# Patient Record
Sex: FEMALE | Race: WHITE | NOT HISPANIC OR LATINO | URBAN - METROPOLITAN AREA
[De-identification: names, ages, dates, MRNs, and addresses within clinical notes are randomized per-mention and may not be internally consistent; named-entity substitution may affect disease eponyms.]

---

## 2019-11-25 ENCOUNTER — APPOINTMENT (RX ONLY)
Dept: URBAN - METROPOLITAN AREA CLINIC 23 | Facility: CLINIC | Age: 49
Setting detail: DERMATOLOGY
End: 2019-11-25

## 2019-11-25 DIAGNOSIS — L81.4 OTHER MELANIN HYPERPIGMENTATION: ICD-10-CM

## 2019-11-25 DIAGNOSIS — D22 MELANOCYTIC NEVI: ICD-10-CM

## 2019-11-25 DIAGNOSIS — D18.0 HEMANGIOMA: ICD-10-CM

## 2019-11-25 DIAGNOSIS — Q828 OTHER SPECIFIED ANOMALIES OF SKIN: ICD-10-CM

## 2019-11-25 DIAGNOSIS — Q826 OTHER SPECIFIED ANOMALIES OF SKIN: ICD-10-CM

## 2019-11-25 DIAGNOSIS — L29.8 OTHER PRURITUS: ICD-10-CM

## 2019-11-25 DIAGNOSIS — L85.3 XEROSIS CUTIS: ICD-10-CM

## 2019-11-25 DIAGNOSIS — Q819 OTHER SPECIFIED ANOMALIES OF SKIN: ICD-10-CM

## 2019-11-25 DIAGNOSIS — L29.89 OTHER PRURITUS: ICD-10-CM

## 2019-11-25 PROBLEM — D18.01 HEMANGIOMA OF SKIN AND SUBCUTANEOUS TISSUE: Status: ACTIVE | Noted: 2019-11-25

## 2019-11-25 PROBLEM — Q82.8 OTHER SPECIFIED CONGENITAL MALFORMATIONS OF SKIN: Status: ACTIVE | Noted: 2019-11-25

## 2019-11-25 PROBLEM — D23.72 OTHER BENIGN NEOPLASM OF SKIN OF LEFT LOWER LIMB, INCLUDING HIP: Status: ACTIVE | Noted: 2019-11-25

## 2019-11-25 PROBLEM — D22.5 MELANOCYTIC NEVI OF TRUNK: Status: ACTIVE | Noted: 2019-11-25

## 2019-11-25 PROCEDURE — ? TREATMENT REGIMEN

## 2019-11-25 PROCEDURE — ? COUNSELING

## 2019-11-25 PROCEDURE — 99203 OFFICE O/P NEW LOW 30 MIN: CPT

## 2019-11-25 ASSESSMENT — LOCATION SIMPLE DESCRIPTION DERM
LOCATION SIMPLE: RIGHT LOWER BACK
LOCATION SIMPLE: RIGHT POSTERIOR UPPER ARM
LOCATION SIMPLE: LEFT THIGH
LOCATION SIMPLE: RIGHT CHEEK
LOCATION SIMPLE: RIGHT BREAST
LOCATION SIMPLE: LEFT UPPER BACK
LOCATION SIMPLE: ABDOMEN
LOCATION SIMPLE: LEFT CLAVICULAR SKIN
LOCATION SIMPLE: LEFT POSTERIOR UPPER ARM

## 2019-11-25 ASSESSMENT — LOCATION DETAILED DESCRIPTION DERM
LOCATION DETAILED: RIGHT SUPERIOR MEDIAL LOWER BACK
LOCATION DETAILED: LEFT PROXIMAL POSTERIOR UPPER ARM
LOCATION DETAILED: RIGHT PROXIMAL POSTERIOR UPPER ARM
LOCATION DETAILED: LEFT SUPERIOR MEDIAL UPPER BACK
LOCATION DETAILED: RIGHT MEDIAL BREAST 5-6:00 REGION
LOCATION DETAILED: RIGHT INFERIOR LATERAL MALAR CHEEK
LOCATION DETAILED: RIGHT RIB CAGE
LOCATION DETAILED: LEFT CLAVICULAR SKIN
LOCATION DETAILED: LEFT ANTERIOR DISTAL THIGH

## 2019-11-25 ASSESSMENT — LOCATION ZONE DERM
LOCATION ZONE: TRUNK
LOCATION ZONE: ARM
LOCATION ZONE: LEG
LOCATION ZONE: FACE

## 2019-11-25 NOTE — PROCEDURE: TREATMENT REGIMEN
Samples Given: CeraVe hydrating cleanser, CeraVe AM, PM lotion
Otc Regimen: Urea cream
Detail Level: Zone
Otc Regimen: Zyrtec Daily for itch
Plan: Discussed atopic precautions, itching rash handout sheet, Refer to Dr. Alamo for patch testing and possibly food allergies.
Modify Regimen: Decrease showers, fragrance free hypoallergenic emollients daily.

## 2020-02-01 ENCOUNTER — HOSPITAL ENCOUNTER (OUTPATIENT)
Dept: MAMMOGRAPHY | Age: 50
Discharge: HOME | End: 2020-02-01
Attending: FAMILY MEDICINE
Payer: COMMERCIAL

## 2020-02-01 DIAGNOSIS — Z12.31 OTHER SCREENING MAMMOGRAM: ICD-10-CM

## 2020-02-01 PROCEDURE — 77063 BREAST TOMOSYNTHESIS BI: CPT

## 2020-02-17 ENCOUNTER — APPOINTMENT (RX ONLY)
Dept: URBAN - METROPOLITAN AREA CLINIC 23 | Facility: CLINIC | Age: 50
Setting detail: DERMATOLOGY
End: 2020-02-17

## 2020-02-17 DIAGNOSIS — Q828 OTHER SPECIFIED ANOMALIES OF SKIN: ICD-10-CM | Status: STABLE

## 2020-02-17 DIAGNOSIS — Q826 OTHER SPECIFIED ANOMALIES OF SKIN: ICD-10-CM | Status: STABLE

## 2020-02-17 DIAGNOSIS — L71.8 OTHER ROSACEA: ICD-10-CM

## 2020-02-17 DIAGNOSIS — Q819 OTHER SPECIFIED ANOMALIES OF SKIN: ICD-10-CM | Status: STABLE

## 2020-02-17 PROBLEM — Q82.8 OTHER SPECIFIED CONGENITAL MALFORMATIONS OF SKIN: Status: ACTIVE | Noted: 2020-02-17

## 2020-02-17 PROCEDURE — ? COUNSELING

## 2020-02-17 PROCEDURE — ? TREATMENT REGIMEN

## 2020-02-17 PROCEDURE — 99213 OFFICE O/P EST LOW 20 MIN: CPT

## 2020-02-17 ASSESSMENT — LOCATION DETAILED DESCRIPTION DERM
LOCATION DETAILED: RIGHT INFERIOR LATERAL MALAR CHEEK
LOCATION DETAILED: RIGHT PROXIMAL POSTERIOR UPPER ARM
LOCATION DETAILED: LEFT PROXIMAL POSTERIOR UPPER ARM
LOCATION DETAILED: LEFT LOWER CUTANEOUS LIP
LOCATION DETAILED: LEFT CENTRAL MALAR CHEEK
LOCATION DETAILED: LEFT INFERIOR CENTRAL MALAR CHEEK

## 2020-02-17 ASSESSMENT — LOCATION SIMPLE DESCRIPTION DERM
LOCATION SIMPLE: LEFT LIP
LOCATION SIMPLE: RIGHT CHEEK
LOCATION SIMPLE: LEFT CHEEK
LOCATION SIMPLE: RIGHT POSTERIOR UPPER ARM
LOCATION SIMPLE: LEFT POSTERIOR UPPER ARM

## 2020-02-17 ASSESSMENT — LOCATION ZONE DERM
LOCATION ZONE: ARM
LOCATION ZONE: FACE
LOCATION ZONE: LIP

## 2020-02-17 NOTE — PROCEDURE: TREATMENT REGIMEN
Detail Level: Zone
Discontinue Regimen: Charcoal organic cleanser and anti-aging products
Plan: Avoid retinol anti-aging. if flares OTC hydrocortisone cream sparingly
Samples Given: Eucerin roughness repair  and Amlactin

## 2020-11-16 ENCOUNTER — APPOINTMENT (RX ONLY)
Dept: URBAN - METROPOLITAN AREA CLINIC 23 | Facility: CLINIC | Age: 50
Setting detail: DERMATOLOGY
End: 2020-11-16

## 2020-11-16 DIAGNOSIS — D18.0 HEMANGIOMA: ICD-10-CM

## 2020-11-16 DIAGNOSIS — L81.4 OTHER MELANIN HYPERPIGMENTATION: ICD-10-CM

## 2020-11-16 DIAGNOSIS — D22 MELANOCYTIC NEVI: ICD-10-CM

## 2020-11-16 PROBLEM — D22.5 MELANOCYTIC NEVI OF TRUNK: Status: ACTIVE | Noted: 2020-11-16

## 2020-11-16 PROBLEM — D23.72 OTHER BENIGN NEOPLASM OF SKIN OF LEFT LOWER LIMB, INCLUDING HIP: Status: ACTIVE | Noted: 2020-11-16

## 2020-11-16 PROBLEM — D18.01 HEMANGIOMA OF SKIN AND SUBCUTANEOUS TISSUE: Status: ACTIVE | Noted: 2020-11-16

## 2020-11-16 PROCEDURE — 99214 OFFICE O/P EST MOD 30 MIN: CPT

## 2020-11-16 PROCEDURE — ? COUNSELING

## 2020-11-16 ASSESSMENT — LOCATION SIMPLE DESCRIPTION DERM
LOCATION SIMPLE: LEFT UPPER BACK
LOCATION SIMPLE: LEFT THIGH
LOCATION SIMPLE: LEFT CLAVICULAR SKIN
LOCATION SIMPLE: RIGHT LOWER BACK

## 2020-11-16 ASSESSMENT — LOCATION DETAILED DESCRIPTION DERM
LOCATION DETAILED: LEFT SUPERIOR MEDIAL UPPER BACK
LOCATION DETAILED: RIGHT SUPERIOR MEDIAL LOWER BACK
LOCATION DETAILED: LEFT ANTERIOR DISTAL THIGH
LOCATION DETAILED: LEFT CLAVICULAR SKIN

## 2020-11-16 ASSESSMENT — LOCATION ZONE DERM
LOCATION ZONE: LEG
LOCATION ZONE: TRUNK

## 2020-11-16 NOTE — HPI: FULL BODY SKIN EXAMINATION
What Is The Reason For Today's Visit?: Full Body Skin Examination
What Is The Reason For Today's Visit? (Being Monitored For X): concerning skin lesions on an annual basis
How Severe Are Your Spot(S)?: mild
Additional History: pt gave verbal consent for treatment/bx today. Witnessed by shanique

## 2021-11-02 ENCOUNTER — HOSPITAL ENCOUNTER (OUTPATIENT)
Dept: LAB | Age: 51
Discharge: HOME | End: 2021-11-02

## 2021-11-02 PROCEDURE — 88305 TISSUE EXAM BY PATHOLOGIST: CPT

## 2021-11-16 ENCOUNTER — APPOINTMENT (RX ONLY)
Dept: URBAN - METROPOLITAN AREA CLINIC 23 | Facility: CLINIC | Age: 51
Setting detail: DERMATOLOGY
End: 2021-11-16

## 2021-11-16 DIAGNOSIS — D22 MELANOCYTIC NEVI: ICD-10-CM

## 2021-11-16 DIAGNOSIS — Z71.89 OTHER SPECIFIED COUNSELING: ICD-10-CM

## 2021-11-16 DIAGNOSIS — L82.1 OTHER SEBORRHEIC KERATOSIS: ICD-10-CM

## 2021-11-16 DIAGNOSIS — L57.8 OTHER SKIN CHANGES DUE TO CHRONIC EXPOSURE TO NONIONIZING RADIATION: ICD-10-CM

## 2021-11-16 DIAGNOSIS — L91.8 OTHER HYPERTROPHIC DISORDERS OF THE SKIN: ICD-10-CM

## 2021-11-16 DIAGNOSIS — L81.4 OTHER MELANIN HYPERPIGMENTATION: ICD-10-CM

## 2021-11-16 DIAGNOSIS — D18.0 HEMANGIOMA: ICD-10-CM

## 2021-11-16 PROBLEM — D18.01 HEMANGIOMA OF SKIN AND SUBCUTANEOUS TISSUE: Status: ACTIVE | Noted: 2021-11-16

## 2021-11-16 PROBLEM — D23.72 OTHER BENIGN NEOPLASM OF SKIN OF LEFT LOWER LIMB, INCLUDING HIP: Status: ACTIVE | Noted: 2021-11-16

## 2021-11-16 PROBLEM — D22.5 MELANOCYTIC NEVI OF TRUNK: Status: ACTIVE | Noted: 2021-11-16

## 2021-11-16 PROCEDURE — ? COUNSELING

## 2021-11-16 PROCEDURE — 99213 OFFICE O/P EST LOW 20 MIN: CPT

## 2021-11-16 ASSESSMENT — LOCATION DETAILED DESCRIPTION DERM
LOCATION DETAILED: LEFT INFERIOR ANTERIOR NECK
LOCATION DETAILED: LEFT ANTERIOR PROXIMAL THIGH
LOCATION DETAILED: LEFT POSTERIOR SHOULDER
LOCATION DETAILED: LEFT LATERAL ABDOMEN
LOCATION DETAILED: LEFT SUPERIOR MEDIAL UPPER BACK
LOCATION DETAILED: LEFT ANTERIOR DISTAL THIGH
LOCATION DETAILED: LEFT CLAVICULAR SKIN
LOCATION DETAILED: RIGHT ANTERIOR PROXIMAL THIGH
LOCATION DETAILED: RIGHT CLAVICULAR SKIN
LOCATION DETAILED: RIGHT SUPERIOR MEDIAL LOWER BACK
LOCATION DETAILED: LEFT INFERIOR LATERAL NECK

## 2021-11-16 ASSESSMENT — LOCATION ZONE DERM
LOCATION ZONE: TRUNK
LOCATION ZONE: LEG
LOCATION ZONE: ARM
LOCATION ZONE: NECK

## 2021-11-16 ASSESSMENT — LOCATION SIMPLE DESCRIPTION DERM
LOCATION SIMPLE: RIGHT LOWER BACK
LOCATION SIMPLE: LEFT UPPER BACK
LOCATION SIMPLE: LEFT ANTERIOR NECK
LOCATION SIMPLE: LEFT SHOULDER
LOCATION SIMPLE: LEFT CLAVICULAR SKIN
LOCATION SIMPLE: LEFT THIGH
LOCATION SIMPLE: ABDOMEN
LOCATION SIMPLE: RIGHT THIGH
LOCATION SIMPLE: RIGHT CLAVICULAR SKIN

## 2021-11-16 NOTE — HPI: FULL BODY SKIN EXAMINATION
What Type Of Note Output Would You Prefer (Optional)?: Standard Output
What Is The Reason For Today's Visit?: Full Body Skin Examination
What Is The Reason For Today's Visit? (Being Monitored For X): concerning skin lesions on an annual basis
How Severe Are Your Spot(S)?: mild
Additional History: Pt gives verbal consent to biopsy. MAYTE Valentino

## 2024-07-10 ENCOUNTER — HOSPITAL ENCOUNTER (EMERGENCY)
Age: 54
Discharge: HOME | End: 2024-07-10
Payer: COMMERCIAL

## 2024-07-10 ENCOUNTER — APPOINTMENT (OUTPATIENT)
Dept: CT IMAGING | Age: 54
End: 2024-07-10
Payer: COMMERCIAL

## 2024-07-10 VITALS
HEART RATE: 70 BPM | BODY MASS INDEX: 40.3 KG/M2 | OXYGEN SATURATION: 99 % | HEIGHT: 58 IN | WEIGHT: 192 LBS | RESPIRATION RATE: 20 BRPM | SYSTOLIC BLOOD PRESSURE: 150 MMHG | TEMPERATURE: 98.7 F | DIASTOLIC BLOOD PRESSURE: 74 MMHG

## 2024-07-10 DIAGNOSIS — N20.0 KIDNEY STONE: Primary | ICD-10-CM

## 2024-07-10 DIAGNOSIS — N30.01 ACUTE CYSTITIS WITH HEMATURIA: ICD-10-CM

## 2024-07-10 LAB
ALBUMIN SERPL-MCNC: 4.4 G/DL (ref 3.5–5)
ALBUMIN/GLOB SERPL: 2.1 (ref 0.4–1.6)
ALP SERPL-CCNC: 65 U/L (ref 45–117)
ALT SERPL-CCNC: 11 U/L (ref 13–61)
ANION GAP SERPL CALC-SCNC: 13 MMOL/L (ref 2–11)
APPEARANCE UR: CLEAR
AST SERPL-CCNC: 20 U/L (ref 15–37)
BACTERIA URNS QL MICRO: ABNORMAL /HPF
BASOPHILS # BLD: 0.1 K/UL (ref 0–0.2)
BASOPHILS NFR BLD: 0 % (ref 0–2)
BILIRUB SERPL-MCNC: 0.6 MG/DL (ref 0.2–1.1)
BILIRUB UR QL: NEGATIVE
BUN SERPL-MCNC: 24 MG/DL (ref 6–23)
CALCIUM SERPL-MCNC: 9.7 MG/DL (ref 8.3–10.4)
CHLORIDE SERPL-SCNC: 103 MMOL/L (ref 98–107)
CO2 SERPL-SCNC: 23 MMOL/L (ref 21–32)
COLOR UR: YELLOW
CREAT SERPL-MCNC: 0.89 MG/DL (ref 0.6–1)
DIFFERENTIAL METHOD BLD: ABNORMAL
EOSINOPHIL # BLD: 0 K/UL (ref 0–0.8)
EOSINOPHIL NFR BLD: 0 % (ref 0.5–7.8)
EPI CELLS #/AREA URNS HPF: ABNORMAL /HPF
ERYTHROCYTE [DISTWIDTH] IN BLOOD BY AUTOMATED COUNT: 12.4 % (ref 11.9–14.6)
GLOBULIN SER CALC-MCNC: 2.1 G/DL (ref 2.8–4.5)
GLUCOSE SERPL-MCNC: 170 MG/DL (ref 65–100)
GLUCOSE UR STRIP.AUTO-MCNC: NEGATIVE MG/DL
HCT VFR BLD AUTO: 42.8 % (ref 35.8–46.3)
HGB BLD-MCNC: 14.6 G/DL (ref 11.7–15.4)
HGB UR QL STRIP: ABNORMAL
IMM GRANULOCYTES # BLD AUTO: 0.1 K/UL (ref 0–0.5)
IMM GRANULOCYTES NFR BLD AUTO: 0 % (ref 0–5)
KETONES UR QL STRIP.AUTO: 15 MG/DL
LEUKOCYTE ESTERASE UR QL STRIP.AUTO: NEGATIVE
LIPASE SERPL-CCNC: 39 U/L (ref 13–60)
LYMPHOCYTES # BLD: 0.7 K/UL (ref 0.5–4.6)
LYMPHOCYTES NFR BLD: 5 % (ref 13–44)
MAGNESIUM SERPL-MCNC: 1.8 MG/DL (ref 1.2–2.6)
MCH RBC QN AUTO: 30.4 PG (ref 26.1–32.9)
MCHC RBC AUTO-ENTMCNC: 34.1 G/DL (ref 31.4–35)
MCV RBC AUTO: 89.2 FL (ref 82–102)
MONOCYTES # BLD: 0.6 K/UL (ref 0.1–1.3)
MONOCYTES NFR BLD: 4 % (ref 4–12)
MUCOUS THREADS URNS QL MICRO: 0 /LPF
NEUTS SEG # BLD: 14.9 K/UL (ref 1.7–8.2)
NEUTS SEG NFR BLD: 91 % (ref 43–78)
NITRITE UR QL STRIP.AUTO: NEGATIVE
NRBC # BLD: 0 K/UL (ref 0–0.2)
OTHER OBSERVATIONS: ABNORMAL
PH UR STRIP: 6 (ref 5–9)
PLATELET # BLD AUTO: 298 K/UL (ref 150–450)
PMV BLD AUTO: 9.6 FL (ref 9.4–12.3)
POTASSIUM SERPL-SCNC: 4.2 MMOL/L (ref 3.5–5.1)
PROT SERPL-MCNC: 6.5 G/DL (ref 6.4–8.2)
PROT UR STRIP-MCNC: NEGATIVE MG/DL
RBC # BLD AUTO: 4.8 M/UL (ref 4.05–5.2)
RBC #/AREA URNS HPF: ABNORMAL /HPF
SODIUM SERPL-SCNC: 139 MMOL/L (ref 133–143)
SP GR UR REFRACTOMETRY: >=1.03 (ref 1–1.02)
UROBILINOGEN UR QL STRIP.AUTO: 0.2 EU/DL (ref 0.2–1)
WBC # BLD AUTO: 16.3 K/UL (ref 4.3–11.1)
WBC URNS QL MICRO: ABNORMAL /HPF

## 2024-07-10 PROCEDURE — 80053 COMPREHEN METABOLIC PANEL: CPT

## 2024-07-10 PROCEDURE — 96365 THER/PROPH/DIAG IV INF INIT: CPT

## 2024-07-10 PROCEDURE — 99285 EMERGENCY DEPT VISIT HI MDM: CPT

## 2024-07-10 PROCEDURE — 83690 ASSAY OF LIPASE: CPT

## 2024-07-10 PROCEDURE — 74177 CT ABD & PELVIS W/CONTRAST: CPT

## 2024-07-10 PROCEDURE — 6360000004 HC RX CONTRAST MEDICATION: Performed by: PHYSICIAN ASSISTANT

## 2024-07-10 PROCEDURE — 96361 HYDRATE IV INFUSION ADD-ON: CPT

## 2024-07-10 PROCEDURE — 81001 URINALYSIS AUTO W/SCOPE: CPT

## 2024-07-10 PROCEDURE — 85025 COMPLETE CBC W/AUTO DIFF WBC: CPT

## 2024-07-10 PROCEDURE — 6360000002 HC RX W HCPCS: Performed by: PHYSICIAN ASSISTANT

## 2024-07-10 PROCEDURE — 2580000003 HC RX 258: Performed by: PHYSICIAN ASSISTANT

## 2024-07-10 PROCEDURE — 83735 ASSAY OF MAGNESIUM: CPT

## 2024-07-10 PROCEDURE — 87086 URINE CULTURE/COLONY COUNT: CPT

## 2024-07-10 RX ORDER — ONDANSETRON 4 MG/1
4 TABLET, FILM COATED ORAL 3 TIMES DAILY PRN
Qty: 15 TABLET | Refills: 0 | Status: SHIPPED | OUTPATIENT
Start: 2024-07-10

## 2024-07-10 RX ORDER — 0.9 % SODIUM CHLORIDE 0.9 %
1000 INTRAVENOUS SOLUTION INTRAVENOUS
Status: COMPLETED | OUTPATIENT
Start: 2024-07-10 | End: 2024-07-10

## 2024-07-10 RX ORDER — HYDROCODONE BITARTRATE AND ACETAMINOPHEN 5; 325 MG/1; MG/1
1 TABLET ORAL EVERY 8 HOURS PRN
Qty: 9 TABLET | Refills: 0 | Status: SHIPPED | OUTPATIENT
Start: 2024-07-10 | End: 2024-07-13

## 2024-07-10 RX ORDER — TRAZODONE HYDROCHLORIDE 50 MG/1
50 TABLET ORAL
COMMUNITY

## 2024-07-10 RX ORDER — LIFITEGRAST 50 MG/ML
SOLUTION/ DROPS OPHTHALMIC
COMMUNITY

## 2024-07-10 RX ORDER — CEFDINIR 300 MG/1
300 CAPSULE ORAL 2 TIMES DAILY
Qty: 14 CAPSULE | Refills: 0 | Status: SHIPPED | OUTPATIENT
Start: 2024-07-10 | End: 2024-07-17

## 2024-07-10 RX ADMIN — IOPAMIDOL 100 ML: 755 INJECTION, SOLUTION INTRAVENOUS at 16:09

## 2024-07-10 RX ADMIN — PIPERACILLIN AND TAZOBACTAM 4500 MG: 4; .5 INJECTION, POWDER, LYOPHILIZED, FOR SOLUTION INTRAVENOUS at 15:29

## 2024-07-10 RX ADMIN — SODIUM CHLORIDE 1000 ML: 9 INJECTION, SOLUTION INTRAVENOUS at 14:30

## 2024-07-10 ASSESSMENT — LIFESTYLE VARIABLES
HOW OFTEN DO YOU HAVE A DRINK CONTAINING ALCOHOL: 2-4 TIMES A MONTH
HOW MANY STANDARD DRINKS CONTAINING ALCOHOL DO YOU HAVE ON A TYPICAL DAY: 1 OR 2

## 2024-07-10 ASSESSMENT — PAIN SCALES - GENERAL: PAINLEVEL_OUTOF10: 3

## 2024-07-10 ASSESSMENT — PAIN - FUNCTIONAL ASSESSMENT: PAIN_FUNCTIONAL_ASSESSMENT: NONE - DENIES PAIN

## 2024-07-10 NOTE — ED TRIAGE NOTES
Ambulatory to ED with c/o lower abdominal pressure. Pt states it radiates to her left flank. Pt was just seen at urgent care and sent to ED to rule out kidney stones.

## 2024-07-10 NOTE — DISCHARGE INSTRUCTIONS
Use meds as directed as needed.  Make sure you take your antibiotics twice a day, drink plenty of fluids.  You will be called by urology office tomorrow to arrange follow-up appointment strain your urine

## 2024-07-11 NOTE — ED PROVIDER NOTES
Emergency Department Provider Note       PCP: Ama Morel APRN - NP   Age: 53 y.o.   Sex: female     DISPOSITION Decision To Discharge 07/10/2024 05:04:32 PM       ICD-10-CM    1. Kidney stone  N20.0 [facility]     HYDROcodone-acetaminophen (NORCO) 5-325 MG per tablet      2. Acute cystitis with hematuria  N30.01           Medical Decision Making     53-year-old female with left flank pain started today.  CBC shows white count of 16 CMP unremarkable urinalysis with blood and bacteria lipase and magnesium normal.  CT scan shows 4 mm stone in the left UVJ with moderate hydro-.  Patient was given a liter normal saline IV she said pain was minimal at 3 and actually felt even less pain after fluids.  Due to elevated white blood cell count she was given Zosyn IV in the ER for possible infected stone.  Referral sent to urology they will see in the office she was given prescription for Omnicef twice a day along with hydrocodone and Zofran.  Urine was sent for culture strict return to ED precautions were given     1 or more acute illnesses that pose a threat to life or bodily function.   Prescription drug management performed.  Shared medical decision making was utilized in creating the patients health plan today.    I independently ordered and reviewed each unique test.  I reviewed external records: provider visit note from PCP.     I interpreted the CT Scan CT abdomen pelvis shows renal stone no diverticulitis or colitis.  I interpreted the urinalysis positive bacteria CBC with elevated white blood count CMP was normal.              History     53-year-old female with sudden onset of left flank pain started today radiating to the left lower quadrant.  She denies history of kidney stones but she does state that eating certain nuts will irritate her stomach.  She has a history of of some bloody stools in the past she has been given some medicines to use she has noted some blood

## 2024-07-13 LAB
BACTERIA SPEC CULT: NORMAL
BACTERIA SPEC CULT: NORMAL
SERVICE CMNT-IMP: NORMAL

## 2024-08-06 ENCOUNTER — HOSPITAL ENCOUNTER (EMERGENCY)
Age: 54
Discharge: HOME | End: 2024-08-06
Payer: COMMERCIAL

## 2024-08-06 ENCOUNTER — APPOINTMENT (OUTPATIENT)
Dept: CT IMAGING | Age: 54
End: 2024-08-06
Payer: COMMERCIAL

## 2024-08-06 VITALS
WEIGHT: 190 LBS | HEART RATE: 68 BPM | TEMPERATURE: 98.6 F | SYSTOLIC BLOOD PRESSURE: 130 MMHG | HEIGHT: 58 IN | OXYGEN SATURATION: 96 % | RESPIRATION RATE: 20 BRPM | DIASTOLIC BLOOD PRESSURE: 73 MMHG | BODY MASS INDEX: 39.88 KG/M2

## 2024-08-06 DIAGNOSIS — N20.1 CALCULUS OF DISTAL LEFT URETER: Primary | ICD-10-CM

## 2024-08-06 DIAGNOSIS — N13.2 HYDRONEPHROSIS WITH URINARY OBSTRUCTION DUE TO URETERAL CALCULUS: ICD-10-CM

## 2024-08-06 LAB
ALBUMIN SERPL-MCNC: 4.3 G/DL (ref 3.5–5)
ALBUMIN/GLOB SERPL: 1.8 (ref 0.4–1.6)
ALP SERPL-CCNC: 61 U/L (ref 45–117)
ALT SERPL-CCNC: 13 U/L (ref 13–61)
AMORPH CRY URNS QL MICRO: ABNORMAL
ANION GAP SERPL CALC-SCNC: 15 MMOL/L (ref 9–18)
APPEARANCE UR: CLEAR
AST SERPL-CCNC: 21 U/L (ref 15–37)
BACTERIA URNS QL MICRO: 0 /HPF
BASOPHILS # BLD: 0.1 K/UL (ref 0–0.2)
BASOPHILS NFR BLD: 1 % (ref 0–2)
BILIRUB SERPL-MCNC: 0.7 MG/DL (ref 0.2–1.1)
BILIRUB UR QL: NEGATIVE
BUN SERPL-MCNC: 27 MG/DL (ref 6–23)
CALCIUM SERPL-MCNC: 9.8 MG/DL (ref 8.3–10.4)
CHLORIDE SERPL-SCNC: 102 MMOL/L (ref 98–107)
CO2 SERPL-SCNC: 24 MMOL/L (ref 21–32)
COLOR UR: YELLOW
CREAT SERPL-MCNC: 0.89 MG/DL (ref 0.6–1)
DIFFERENTIAL METHOD BLD: ABNORMAL
EOSINOPHIL # BLD: 0.1 K/UL (ref 0–0.8)
EOSINOPHIL NFR BLD: 1 % (ref 0.5–7.8)
EPI CELLS #/AREA URNS HPF: ABNORMAL /HPF
ERYTHROCYTE [DISTWIDTH] IN BLOOD BY AUTOMATED COUNT: 12.3 % (ref 11.9–14.6)
GLOBULIN SER CALC-MCNC: 2.4 G/DL (ref 2.8–4.5)
GLUCOSE SERPL-MCNC: 130 MG/DL (ref 65–100)
GLUCOSE UR STRIP.AUTO-MCNC: NEGATIVE MG/DL
HCT VFR BLD AUTO: 42.1 % (ref 35.8–46.3)
HGB BLD-MCNC: 14.1 G/DL (ref 11.7–15.4)
HGB UR QL STRIP: ABNORMAL
IMM GRANULOCYTES # BLD AUTO: 0.1 K/UL (ref 0–0.5)
IMM GRANULOCYTES NFR BLD AUTO: 0 % (ref 0–5)
KETONES UR QL STRIP.AUTO: 15 MG/DL
LEUKOCYTE ESTERASE UR QL STRIP.AUTO: NEGATIVE
LIPASE SERPL-CCNC: 45 U/L (ref 13–60)
LYMPHOCYTES # BLD: 1.4 K/UL (ref 0.5–4.6)
LYMPHOCYTES NFR BLD: 9 % (ref 13–44)
MCH RBC QN AUTO: 30.7 PG (ref 26.1–32.9)
MCHC RBC AUTO-ENTMCNC: 33.5 G/DL (ref 31.4–35)
MCV RBC AUTO: 91.5 FL (ref 82–102)
MONOCYTES # BLD: 0.8 K/UL (ref 0.1–1.3)
MONOCYTES NFR BLD: 5 % (ref 4–12)
MUCOUS THREADS URNS QL MICRO: ABNORMAL /LPF
NEUTS SEG # BLD: 13.6 K/UL (ref 1.7–8.2)
NEUTS SEG NFR BLD: 85 % (ref 43–78)
NITRITE UR QL STRIP.AUTO: NEGATIVE
NRBC # BLD: 0 K/UL (ref 0–0.2)
OTHER OBSERVATIONS: ABNORMAL
PH UR STRIP: 7 (ref 5–9)
PLATELET # BLD AUTO: 283 K/UL (ref 150–450)
PMV BLD AUTO: 9.7 FL (ref 9.4–12.3)
POTASSIUM SERPL-SCNC: 4.3 MMOL/L (ref 3.5–5.1)
PROT SERPL-MCNC: 6.7 G/DL (ref 6.4–8.2)
PROT UR STRIP-MCNC: NEGATIVE MG/DL
RBC # BLD AUTO: 4.6 M/UL (ref 4.05–5.2)
RBC #/AREA URNS HPF: ABNORMAL /HPF
SODIUM SERPL-SCNC: 141 MMOL/L (ref 133–143)
SP GR UR REFRACTOMETRY: 1.02 (ref 1–1.02)
UROBILINOGEN UR QL STRIP.AUTO: 0.2 EU/DL (ref 0.2–1)
WBC # BLD AUTO: 16.1 K/UL (ref 4.3–11.1)
WBC URNS QL MICRO: ABNORMAL /HPF

## 2024-08-06 PROCEDURE — 83690 ASSAY OF LIPASE: CPT

## 2024-08-06 PROCEDURE — 81001 URINALYSIS AUTO W/SCOPE: CPT

## 2024-08-06 PROCEDURE — 85025 COMPLETE CBC W/AUTO DIFF WBC: CPT

## 2024-08-06 PROCEDURE — 80053 COMPREHEN METABOLIC PANEL: CPT

## 2024-08-06 PROCEDURE — 99284 EMERGENCY DEPT VISIT MOD MDM: CPT

## 2024-08-06 PROCEDURE — 74176 CT ABD & PELVIS W/O CONTRAST: CPT

## 2024-08-06 PROCEDURE — 2580000003 HC RX 258: Performed by: PHYSICIAN ASSISTANT

## 2024-08-06 RX ORDER — 0.9 % SODIUM CHLORIDE 0.9 %
1000 INTRAVENOUS SOLUTION INTRAVENOUS
Status: COMPLETED | OUTPATIENT
Start: 2024-08-06 | End: 2024-08-06

## 2024-08-06 RX ORDER — CEPHALEXIN 500 MG/1
500 CAPSULE ORAL 4 TIMES DAILY
Qty: 28 CAPSULE | Refills: 0 | Status: SHIPPED | OUTPATIENT
Start: 2024-08-06 | End: 2024-08-13

## 2024-08-06 RX ADMIN — SODIUM CHLORIDE 1000 ML: 9 INJECTION, SOLUTION INTRAVENOUS at 13:57

## 2024-08-06 ASSESSMENT — ENCOUNTER SYMPTOMS
CHEST TIGHTNESS: 0
EYE REDNESS: 0
RHINORRHEA: 0
DIARRHEA: 0
VOMITING: 0
NAUSEA: 1
COUGH: 0
BACK PAIN: 0
SHORTNESS OF BREATH: 0
ABDOMINAL PAIN: 1
ABDOMINAL DISTENTION: 0
SORE THROAT: 0

## 2024-08-06 ASSESSMENT — PAIN SCALES - GENERAL: PAINLEVEL_OUTOF10: 3

## 2024-08-06 ASSESSMENT — LIFESTYLE VARIABLES
HOW MANY STANDARD DRINKS CONTAINING ALCOHOL DO YOU HAVE ON A TYPICAL DAY: 1 OR 2
HOW OFTEN DO YOU HAVE A DRINK CONTAINING ALCOHOL: MONTHLY OR LESS

## 2024-08-06 ASSESSMENT — PAIN - FUNCTIONAL ASSESSMENT: PAIN_FUNCTIONAL_ASSESSMENT: 0-10

## 2024-08-06 NOTE — ED TRIAGE NOTES
Pt to ed with c/o left flank pain that started this morning with urinary frequency. Pt denies burning with urination. Pt reports hx of kidney stones. Pt also reports nausea but denies vomiting.

## 2024-08-06 NOTE — ED PROVIDER NOTES
Emergency Department Provider Note       PCP: Ama Morel APRN - NP   Age: 53 y.o.   Sex: female     DISPOSITION Decision To Discharge 08/06/2024 04:24:04 PM       ICD-10-CM    1. Calculus of distal left ureter  N20.1       2. Hydronephrosis with urinary obstruction due to ureteral calculus  N13.2           Medical Decision Making     Patient is a 53-year-old female presenting with left flank pain groin pain increased urinary urgency and frequency.  Did have some nausea but took nausea medication and that seemed to help no episodes of vomiting.  States that she had kidney stone about a month ago and that this feels very similar.  She does report straining her urine but never saw if she passed the stone or not.  She has an appointment this upcoming Monday to follow-up with urology.  She states her pain at this time is a 1 out of 10 because she took medications prior to coming to the ER and they are starting to kick in.  She is afebrile nontoxic in appearance, vital signs within appropriate limits.  Lab work does show that she has an elevated white count of 16 therefore will obtain CT imaging to assess for obstructing stone.  CT abdomen pelvis shows 5 mm distal left ureter stone at the left UVJ causing moderate hydronephrosis and hydroureter.    CT does not appear to be much different than previous imaging from a month ago I do suspect that this is likely the same stone and that it is stuck at the UVJ.  Her pain is well-controlled she is nontoxic in appearance.  She has an appointment in 6 days with urology for follow-up.  I will send her home on some antibiotics given elevated white count, she still has plenty of pain medication and Zofran at home.  Recommend that she increase her fluid intake and follow-up on Monday with urology for further evaluation.  She was given strict return precautions.  Patient verbalizes understanding and is agreeable to plan.     1 acute complicated illness or injury.  Over the

## 2024-08-06 NOTE — DISCHARGE INSTRUCTIONS
You have a kidney stone on the left side, it does not look like it has moved since you were seen here 1 month ago.  There is a possibility that you may have did have a procedure to remove the stone.  I am sending you home with Keflex to cover for any sort of bacterial infection.  Please follow-up with urology on Monday for previously scheduled appointment.  If at any time spike of fever, persistent vomiting or severe pain that is not tolerable with medications provided please return to the ER for further evaluation.

## 2024-08-12 ENCOUNTER — PREP FOR PROCEDURE (OUTPATIENT)
Dept: ONCOLOGY | Age: 54
End: 2024-08-12

## 2024-08-12 ENCOUNTER — TELEPHONE (OUTPATIENT)
Dept: UROLOGY | Age: 54
End: 2024-08-12

## 2024-08-12 ENCOUNTER — OFFICE VISIT (OUTPATIENT)
Dept: UROLOGY | Age: 54
End: 2024-08-12
Payer: COMMERCIAL

## 2024-08-12 DIAGNOSIS — N20.1 LEFT URETERAL STONE: ICD-10-CM

## 2024-08-12 DIAGNOSIS — N30.00 ACUTE CYSTITIS WITHOUT HEMATURIA: ICD-10-CM

## 2024-08-12 DIAGNOSIS — R32 URINARY INCONTINENCE, UNSPECIFIED TYPE: ICD-10-CM

## 2024-08-12 DIAGNOSIS — N20.1 LEFT URETERAL CALCULUS: ICD-10-CM

## 2024-08-12 DIAGNOSIS — N20.0 KIDNEY STONE: Primary | ICD-10-CM

## 2024-08-12 LAB
BILIRUBIN, URINE, POC: NEGATIVE
BLOOD URINE, POC: NEGATIVE
GLUCOSE URINE, POC: NEGATIVE
KETONES, URINE, POC: NEGATIVE
LEUKOCYTE ESTERASE, URINE, POC: NEGATIVE
NITRITE, URINE, POC: NEGATIVE
PH, URINE, POC: 7 (ref 4.6–8)
PROTEIN,URINE, POC: NEGATIVE
SPECIFIC GRAVITY, URINE, POC: 1.02 (ref 1–1.03)
URINALYSIS CLARITY, POC: NORMAL
URINALYSIS COLOR, POC: NORMAL
UROBILINOGEN, POC: NORMAL

## 2024-08-12 PROCEDURE — 81003 URINALYSIS AUTO W/O SCOPE: CPT | Performed by: NURSE PRACTITIONER

## 2024-08-12 PROCEDURE — 99204 OFFICE O/P NEW MOD 45 MIN: CPT | Performed by: NURSE PRACTITIONER

## 2024-08-12 RX ORDER — PHENAZOPYRIDINE HYDROCHLORIDE 200 MG/1
200 TABLET, FILM COATED ORAL 3 TIMES DAILY PRN
Qty: 15 TABLET | Refills: 0 | Status: SHIPPED | OUTPATIENT
Start: 2024-08-12 | End: 2024-08-17

## 2024-08-12 RX ORDER — ONDANSETRON 4 MG/1
4 TABLET, ORALLY DISINTEGRATING ORAL 3 TIMES DAILY PRN
Qty: 21 TABLET | Refills: 0 | Status: SHIPPED | OUTPATIENT
Start: 2024-08-12

## 2024-08-12 ASSESSMENT — ENCOUNTER SYMPTOMS
COUGH: 0
BACK PAIN: 1
ABDOMINAL PAIN: 0
EYE PAIN: 0
WHEEZING: 0
SKIN LESIONS: 0
EYE DISCHARGE: 0
SHORTNESS OF BREATH: 0
CONSTIPATION: 0
BLOOD IN STOOL: 0
INDIGESTION: 0
VOMITING: 0
HEARTBURN: 0
NAUSEA: 0
DIARRHEA: 0

## 2024-08-12 NOTE — PROGRESS NOTES
[facility]  [address]  [place] [address]  [phone]          Waleska Gr  : 1970    Chief Complaint   Patient presents with    New Patient    Nephrolithiasis          HPI     Waleska Gr is a 53 y.o. female referred for kidney stone. No prior h/o renal stone. Started to have urinary symptoms back in 2024. Seen in the ER both 7/10/24 and 24. CT both dates showed 4-5 mm L UVJ stone. B renal stones. Images reviewed personally. She has not passed the stone. Started on Keflex for ?UTI. Has 2 more days of this.     Has baseline urinary incontinence.       Past Medical History:   Diagnosis Date    Cervicalgia      Past Surgical History:   Procedure Laterality Date    BREAST BIOPSY Left 2014    Lt breast us core bx     Current Outpatient Medications   Medication Sig Dispense Refill    ondansetron (ZOFRAN-ODT) 4 MG disintegrating tablet Take 1 tablet by mouth 3 times daily as needed for Nausea or Vomiting 21 tablet 0    phenazopyridine (PYRIDIUM) 200 MG tablet Take 1 tablet by mouth 3 times daily as needed for Pain 15 tablet 0    hyoscyamine (LEVSIN/SL) 125 MCG sublingual tablet Place 1 tablet under the tongue every 6 hours as needed (spasm) 20 tablet 0    cephALEXin (KEFLEX) 500 MG capsule Take 1 capsule by mouth 4 times daily for 7 days 28 capsule 0    traZODone (DESYREL) 50 MG tablet 1 tablet      Lifitegrast (XIIDRA) 5 % SOLN 1 drop into affected eye Ophthalmic Twice a day      ondansetron (ZOFRAN) 4 MG tablet Take 1 tablet by mouth 3 times daily as needed for Nausea or Vomiting 15 tablet 0     No current facility-administered medications for this visit.     No Known Allergies  Social History     Socioeconomic History    Marital status:      Spouse name: Not on file    Number of children: Not on file    Years of education: Not on file    Highest education level: Not on file   Occupational History    Not on file   Tobacco Use    Smoking  Hydroxychloroquine Counseling:  I discussed with the patient that a baseline ophthalmologic exam is needed at the start of therapy and every year thereafter while on therapy. A CBC may also be warranted for monitoring.  The side effects of this medication were discussed with the patient, including but not limited to agranulocytosis, aplastic anemia, seizures, rashes, retinopathy, and liver toxicity. Patient instructed to call the office should any adverse effect occur.  The patient verbalized understanding of the proper use and possible adverse effects of Plaquenil.  All the patient's questions and concerns were addressed.

## 2024-08-12 NOTE — TELEPHONE ENCOUNTER
Cysto/LEFT ureteroscopy/laser lithotripsy/LEFT ureteral stent placement with Dr. Oliveros next Tuesday (8/20/24). Discussed with Dr Oliveros. Hope to schedule.     Please coordiniate who will contact patient. Patient advised someone will call today or lasted tomorrow.    Mylene Hector, TERRI - CNP

## 2024-08-15 RX ORDER — HYDROCODONE BITARTRATE AND ACETAMINOPHEN 5; 325 MG/1; MG/1
1 TABLET ORAL EVERY 6 HOURS PRN
COMMUNITY

## 2024-08-15 NOTE — PERIOP NOTE
Patient verified name and .  Order for consent was found in EHR and matches case posting; patient verifies procedure.   Type 1B surgery, phone assessment complete.  Orders were received.  Labs per surgeon: PAT Protocol, UA, CBC  Labs per anesthesia protocol: K+  Patient coming to [address] [place] [address] Dr. Rodriguez 310 on 24 at 8:30 for lab collection. Chart flagged for Charge Nurse f/u    Patient answered medical/surgical history questions at their best of ability. All prior to admission medications documented in EPIC.    Patient instructed to continue taking all prescription medications up to the day of surgery but to take only the following medications the day of surgery according to anesthesia guidelines with a small sip of water: Hydrocodone-acetaminophen (Norco) if needed,  Zofran (ondansetron) if needed, Phenazopyridine (Pyridium) if needed, Hyoscyamine (Levsin/SL) if needed Also, patient is requested to take 2 Tylenol in the morning and then again before bed on the day before surgery. Regular or extra strength may be used.       Patient informed that all vitamins and supplements should be held 7 days prior to surgery and NSAIDS 5 days prior to surgery. Prescription meds to hold:none    Patient instructed on the following:    > Arrive at Main  Entrance, time of arrival to be called the day before by 1700  > NPO after midnight, unless otherwise indicated, including gum, mints, and ice chips  > Responsible adult must drive patient to the hospital, stay during surgery, and patient will need supervision 24 hours after anesthesia  > Use non moisturizing soap in shower the night before surgery and on the morning of surgery  > All piercings must be removed prior to arrival.    > Leave all valuables (money and jewelry) at home but bring insurance card and ID on DOS.   > You may be required to pay a deductible or co-pay on the day of your procedure. You can pre-pay by calling [phone] if your surgery is at the

## 2024-08-16 ENCOUNTER — HOSPITAL ENCOUNTER (OUTPATIENT)
Dept: LAB | Age: 54
Discharge: HOME | End: 2024-08-16
Payer: COMMERCIAL

## 2024-08-16 DIAGNOSIS — N20.1 LEFT URETERAL STONE: ICD-10-CM

## 2024-08-16 DIAGNOSIS — Z01.818 PRE-OP TESTING: ICD-10-CM

## 2024-08-16 LAB
APPEARANCE UR: CLEAR
BASOPHILS # BLD: 0.1 K/UL (ref 0–0.2)
BASOPHILS NFR BLD: 1 % (ref 0–2)
BILIRUB UR QL: NEGATIVE
COLOR UR: NORMAL
DIFFERENTIAL METHOD BLD: NORMAL
EOSINOPHIL # BLD: 0.2 K/UL (ref 0–0.8)
EOSINOPHIL NFR BLD: 2 % (ref 0.5–7.8)
ERYTHROCYTE [DISTWIDTH] IN BLOOD BY AUTOMATED COUNT: 12.3 % (ref 11.9–14.6)
GLUCOSE UR STRIP.AUTO-MCNC: NEGATIVE MG/DL
HCT VFR BLD AUTO: 41.2 % (ref 35.8–46.3)
HGB BLD-MCNC: 13.6 G/DL (ref 11.7–15.4)
HGB UR QL STRIP: NEGATIVE
IMM GRANULOCYTES # BLD AUTO: 0 K/UL (ref 0–0.5)
IMM GRANULOCYTES NFR BLD AUTO: 0 % (ref 0–5)
KETONES UR QL STRIP.AUTO: NEGATIVE MG/DL
LEUKOCYTE ESTERASE UR QL STRIP.AUTO: NEGATIVE
LYMPHOCYTES # BLD: 2.2 K/UL (ref 0.5–4.6)
LYMPHOCYTES NFR BLD: 26 % (ref 13–44)
MCH RBC QN AUTO: 30 PG (ref 26.1–32.9)
MCHC RBC AUTO-ENTMCNC: 33 G/DL (ref 31.4–35)
MCV RBC AUTO: 90.7 FL (ref 82–102)
MONOCYTES # BLD: 0.7 K/UL (ref 0.1–1.3)
MONOCYTES NFR BLD: 9 % (ref 4–12)
NEUTS SEG # BLD: 5.2 K/UL (ref 1.7–8.2)
NEUTS SEG NFR BLD: 62 % (ref 43–78)
NITRITE UR QL STRIP.AUTO: NEGATIVE
NRBC # BLD: 0 K/UL (ref 0–0.2)
PH UR STRIP: 7 (ref 5–9)
PLATELET # BLD AUTO: 280 K/UL (ref 150–450)
PMV BLD AUTO: 10.1 FL (ref 9.4–12.3)
POTASSIUM SERPL-SCNC: 4.2 MMOL/L (ref 3.5–5.1)
PROT UR STRIP-MCNC: NEGATIVE MG/DL
RBC # BLD AUTO: 4.54 M/UL (ref 4.05–5.2)
SP GR UR REFRACTOMETRY: 1.01 (ref 1–1.02)
UROBILINOGEN UR QL STRIP.AUTO: 0.2 EU/DL (ref 0.2–1)
WBC # BLD AUTO: 8.3 K/UL (ref 4.3–11.1)

## 2024-08-16 PROCEDURE — 85025 COMPLETE CBC W/AUTO DIFF WBC: CPT

## 2024-08-16 PROCEDURE — 84132 ASSAY OF SERUM POTASSIUM: CPT

## 2024-08-16 PROCEDURE — 36415 COLL VENOUS BLD VENIPUNCTURE: CPT

## 2024-08-16 PROCEDURE — 81003 URINALYSIS AUTO W/O SCOPE: CPT

## 2024-08-19 ENCOUNTER — ANESTHESIA EVENT (OUTPATIENT)
Dept: SURGERY | Age: 54
End: 2024-08-19
Payer: COMMERCIAL

## 2024-08-20 ENCOUNTER — HOSPITAL ENCOUNTER (OUTPATIENT)
Age: 54
Setting detail: OUTPATIENT SURGERY
Discharge: HOME | End: 2024-08-20
Attending: UROLOGY | Admitting: UROLOGY
Payer: COMMERCIAL

## 2024-08-20 ENCOUNTER — ANESTHESIA (OUTPATIENT)
Dept: SURGERY | Age: 54
End: 2024-08-20
Payer: COMMERCIAL

## 2024-08-20 ENCOUNTER — APPOINTMENT (OUTPATIENT)
Dept: GENERAL RADIOLOGY | Age: 54
End: 2024-08-20
Attending: UROLOGY
Payer: COMMERCIAL

## 2024-08-20 VITALS
WEIGHT: 192 LBS | TEMPERATURE: 97.6 F | HEART RATE: 65 BPM | RESPIRATION RATE: 16 BRPM | DIASTOLIC BLOOD PRESSURE: 58 MMHG | OXYGEN SATURATION: 94 % | BODY MASS INDEX: 40.3 KG/M2 | SYSTOLIC BLOOD PRESSURE: 119 MMHG | HEIGHT: 58 IN

## 2024-08-20 DIAGNOSIS — Z01.818 PRE-OP TESTING: Primary | ICD-10-CM

## 2024-08-20 DIAGNOSIS — N20.1 LEFT URETERAL CALCULUS: Primary | ICD-10-CM

## 2024-08-20 LAB — HCG UR QL: NEGATIVE

## 2024-08-20 PROCEDURE — 6360000002 HC RX W HCPCS: Performed by: ANESTHESIOLOGY

## 2024-08-20 PROCEDURE — 2500000003 HC RX 250 WO HCPCS: Performed by: NURSE ANESTHETIST, CERTIFIED REGISTERED

## 2024-08-20 PROCEDURE — 3600000004 HC SURGERY LEVEL 4 BASE: Performed by: UROLOGY

## 2024-08-20 PROCEDURE — 6360000002 HC RX W HCPCS: Performed by: UROLOGY

## 2024-08-20 PROCEDURE — 2580000003 HC RX 258: Performed by: ANESTHESIOLOGY

## 2024-08-20 PROCEDURE — 81025 URINE PREGNANCY TEST: CPT

## 2024-08-20 PROCEDURE — 7100000010 HC PHASE II RECOVERY - FIRST 15 MIN: Performed by: UROLOGY

## 2024-08-20 PROCEDURE — 6360000002 HC RX W HCPCS: Performed by: NURSE ANESTHETIST, CERTIFIED REGISTERED

## 2024-08-20 PROCEDURE — C1769 GUIDE WIRE: HCPCS | Performed by: UROLOGY

## 2024-08-20 PROCEDURE — 3700000000 HC ANESTHESIA ATTENDED CARE: Performed by: UROLOGY

## 2024-08-20 PROCEDURE — 7100000011 HC PHASE II RECOVERY - ADDTL 15 MIN: Performed by: UROLOGY

## 2024-08-20 PROCEDURE — C2617 STENT, NON-COR, TEM W/O DEL: HCPCS | Performed by: UROLOGY

## 2024-08-20 PROCEDURE — 82355 CALCULUS ANALYSIS QUAL: CPT

## 2024-08-20 PROCEDURE — 7100000001 HC PACU RECOVERY - ADDTL 15 MIN: Performed by: UROLOGY

## 2024-08-20 PROCEDURE — 6370000000 HC RX 637 (ALT 250 FOR IP): Performed by: ANESTHESIOLOGY

## 2024-08-20 PROCEDURE — 2720000010 HC SURG SUPPLY STERILE: Performed by: UROLOGY

## 2024-08-20 PROCEDURE — 52356 CYSTO/URETERO W/LITHOTRIPSY: CPT | Performed by: UROLOGY

## 2024-08-20 PROCEDURE — 2709999900 HC NON-CHARGEABLE SUPPLY: Performed by: UROLOGY

## 2024-08-20 PROCEDURE — 3600000014 HC SURGERY LEVEL 4 ADDTL 15MIN: Performed by: UROLOGY

## 2024-08-20 PROCEDURE — 74420 UROGRAPHY RTRGR +-KUB: CPT | Performed by: UROLOGY

## 2024-08-20 PROCEDURE — 3700000001 HC ADD 15 MINUTES (ANESTHESIA): Performed by: UROLOGY

## 2024-08-20 PROCEDURE — 2500000003 HC RX 250 WO HCPCS: Performed by: ANESTHESIOLOGY

## 2024-08-20 PROCEDURE — 88300 SURGICAL PATH GROSS: CPT

## 2024-08-20 PROCEDURE — 7100000000 HC PACU RECOVERY - FIRST 15 MIN: Performed by: UROLOGY

## 2024-08-20 DEVICE — URETERAL STENT
Type: IMPLANTABLE DEVICE | Site: URETER | Status: FUNCTIONAL
Brand: PERCUFLEX™ PLUS

## 2024-08-20 RX ORDER — IPRATROPIUM BROMIDE AND ALBUTEROL SULFATE 2.5; .5 MG/3ML; MG/3ML
1 SOLUTION RESPIRATORY (INHALATION)
Status: DISCONTINUED | OUTPATIENT
Start: 2024-08-20 | End: 2024-08-20 | Stop reason: HOSPADM

## 2024-08-20 RX ORDER — ONDANSETRON 2 MG/ML
4 INJECTION INTRAMUSCULAR; INTRAVENOUS
Status: COMPLETED | OUTPATIENT
Start: 2024-08-20 | End: 2024-08-20

## 2024-08-20 RX ORDER — PROPOFOL 10 MG/ML
INJECTION, EMULSION INTRAVENOUS PRN
Status: DISCONTINUED | OUTPATIENT
Start: 2024-08-20 | End: 2024-08-20 | Stop reason: SDUPTHER

## 2024-08-20 RX ORDER — ACETAMINOPHEN 500 MG
1000 TABLET ORAL ONCE
Status: COMPLETED | OUTPATIENT
Start: 2024-08-20 | End: 2024-08-20

## 2024-08-20 RX ORDER — LIDOCAINE HYDROCHLORIDE 20 MG/ML
INJECTION, SOLUTION EPIDURAL; INFILTRATION; INTRACAUDAL; PERINEURAL PRN
Status: DISCONTINUED | OUTPATIENT
Start: 2024-08-20 | End: 2024-08-20 | Stop reason: SDUPTHER

## 2024-08-20 RX ORDER — FENTANYL CITRATE 50 UG/ML
INJECTION, SOLUTION INTRAMUSCULAR; INTRAVENOUS PRN
Status: DISCONTINUED | OUTPATIENT
Start: 2024-08-20 | End: 2024-08-20 | Stop reason: SDUPTHER

## 2024-08-20 RX ORDER — SODIUM CHLORIDE 0.9 % (FLUSH) 0.9 %
5-40 SYRINGE (ML) INJECTION EVERY 12 HOURS SCHEDULED
Status: DISCONTINUED | OUTPATIENT
Start: 2024-08-20 | End: 2024-08-20 | Stop reason: HOSPADM

## 2024-08-20 RX ORDER — FENTANYL CITRATE 50 UG/ML
50 INJECTION, SOLUTION INTRAMUSCULAR; INTRAVENOUS EVERY 5 MIN PRN
Status: DISCONTINUED | OUTPATIENT
Start: 2024-08-20 | End: 2024-08-20 | Stop reason: HOSPADM

## 2024-08-20 RX ORDER — SODIUM CHLORIDE 0.9 % (FLUSH) 0.9 %
5-40 SYRINGE (ML) INJECTION PRN
Status: DISCONTINUED | OUTPATIENT
Start: 2024-08-20 | End: 2024-08-20 | Stop reason: HOSPADM

## 2024-08-20 RX ORDER — DEXAMETHASONE SODIUM PHOSPHATE 10 MG/ML
INJECTION INTRAMUSCULAR; INTRAVENOUS PRN
Status: DISCONTINUED | OUTPATIENT
Start: 2024-08-20 | End: 2024-08-20 | Stop reason: SDUPTHER

## 2024-08-20 RX ORDER — ONDANSETRON 2 MG/ML
INJECTION INTRAMUSCULAR; INTRAVENOUS PRN
Status: DISCONTINUED | OUTPATIENT
Start: 2024-08-20 | End: 2024-08-20 | Stop reason: SDUPTHER

## 2024-08-20 RX ORDER — SODIUM CHLORIDE, SODIUM LACTATE, POTASSIUM CHLORIDE, CALCIUM CHLORIDE 600; 310; 30; 20 MG/100ML; MG/100ML; MG/100ML; MG/100ML
INJECTION, SOLUTION INTRAVENOUS CONTINUOUS
Status: DISCONTINUED | OUTPATIENT
Start: 2024-08-20 | End: 2024-08-20 | Stop reason: HOSPADM

## 2024-08-20 RX ORDER — SUCCINYLCHOLINE/SOD CL,ISO/PF 200MG/10ML
SYRINGE (ML) INTRAVENOUS PRN
Status: DISCONTINUED | OUTPATIENT
Start: 2024-08-20 | End: 2024-08-20 | Stop reason: SDUPTHER

## 2024-08-20 RX ORDER — HYDROMORPHONE HYDROCHLORIDE 2 MG/ML
0.5 INJECTION, SOLUTION INTRAMUSCULAR; INTRAVENOUS; SUBCUTANEOUS EVERY 10 MIN PRN
Status: DISCONTINUED | OUTPATIENT
Start: 2024-08-20 | End: 2024-08-20 | Stop reason: HOSPADM

## 2024-08-20 RX ORDER — OXYCODONE HYDROCHLORIDE 5 MG/1
5 TABLET ORAL
Status: DISCONTINUED | OUTPATIENT
Start: 2024-08-20 | End: 2024-08-20 | Stop reason: HOSPADM

## 2024-08-20 RX ORDER — NALOXONE HYDROCHLORIDE 0.4 MG/ML
INJECTION, SOLUTION INTRAMUSCULAR; INTRAVENOUS; SUBCUTANEOUS PRN
Status: DISCONTINUED | OUTPATIENT
Start: 2024-08-20 | End: 2024-08-20 | Stop reason: HOSPADM

## 2024-08-20 RX ORDER — LIDOCAINE HYDROCHLORIDE 10 MG/ML
1 INJECTION, SOLUTION INFILTRATION; PERINEURAL
Status: COMPLETED | OUTPATIENT
Start: 2024-08-20 | End: 2024-08-20

## 2024-08-20 RX ORDER — ROCURONIUM BROMIDE 10 MG/ML
INJECTION, SOLUTION INTRAVENOUS PRN
Status: DISCONTINUED | OUTPATIENT
Start: 2024-08-20 | End: 2024-08-20 | Stop reason: SDUPTHER

## 2024-08-20 RX ORDER — MIDAZOLAM HYDROCHLORIDE 2 MG/2ML
2 INJECTION, SOLUTION INTRAMUSCULAR; INTRAVENOUS
Status: DISCONTINUED | OUTPATIENT
Start: 2024-08-20 | End: 2024-08-20 | Stop reason: HOSPADM

## 2024-08-20 RX ORDER — HALOPERIDOL 5 MG/ML
1 INJECTION INTRAMUSCULAR
Status: COMPLETED | OUTPATIENT
Start: 2024-08-20 | End: 2024-08-20

## 2024-08-20 RX ADMIN — FENTANYL CITRATE 25 MCG: 50 INJECTION, SOLUTION INTRAMUSCULAR; INTRAVENOUS at 07:30

## 2024-08-20 RX ADMIN — ONDANSETRON 4 MG: 2 INJECTION INTRAMUSCULAR; INTRAVENOUS at 07:07

## 2024-08-20 RX ADMIN — ACETAMINOPHEN 1000 MG: 500 TABLET, FILM COATED ORAL at 06:02

## 2024-08-20 RX ADMIN — FENTANYL CITRATE 25 MCG: 50 INJECTION, SOLUTION INTRAMUSCULAR; INTRAVENOUS at 07:44

## 2024-08-20 RX ADMIN — Medication 110 MG: at 07:03

## 2024-08-20 RX ADMIN — PROPOFOL 200 MG: 10 INJECTION, EMULSION INTRAVENOUS at 07:02

## 2024-08-20 RX ADMIN — PROPOFOL 60 MG: 10 INJECTION, EMULSION INTRAVENOUS at 07:44

## 2024-08-20 RX ADMIN — DEXAMETHASONE SODIUM PHOSPHATE 10 MG: 10 INJECTION INTRAMUSCULAR; INTRAVENOUS at 07:07

## 2024-08-20 RX ADMIN — FENTANYL CITRATE 50 MCG: 50 INJECTION, SOLUTION INTRAMUSCULAR; INTRAVENOUS at 07:10

## 2024-08-20 RX ADMIN — LIDOCAINE HYDROCHLORIDE 1 ML: 10 INJECTION, SOLUTION INFILTRATION; PERINEURAL at 06:12

## 2024-08-20 RX ADMIN — ROCURONIUM BROMIDE 5 MG: 10 INJECTION, SOLUTION INTRAVENOUS at 07:02

## 2024-08-20 RX ADMIN — Medication 2 G: at 07:07

## 2024-08-20 RX ADMIN — PHENYLEPHRINE HYDROCHLORIDE 50 MCG: 0.1 INJECTION, SOLUTION INTRAVENOUS at 07:03

## 2024-08-20 RX ADMIN — HALOPERIDOL LACTATE 1 MG: 5 INJECTION, SOLUTION INTRAMUSCULAR at 08:56

## 2024-08-20 RX ADMIN — ONDANSETRON 4 MG: 2 INJECTION INTRAMUSCULAR; INTRAVENOUS at 08:33

## 2024-08-20 RX ADMIN — SODIUM CHLORIDE, POTASSIUM CHLORIDE, SODIUM LACTATE AND CALCIUM CHLORIDE: 600; 310; 30; 20 INJECTION, SOLUTION INTRAVENOUS at 06:12

## 2024-08-20 RX ADMIN — LIDOCAINE HYDROCHLORIDE 100 MG: 20 INJECTION, SOLUTION EPIDURAL; INFILTRATION; INTRACAUDAL; PERINEURAL at 07:02

## 2024-08-20 ASSESSMENT — PAIN - FUNCTIONAL ASSESSMENT: PAIN_FUNCTIONAL_ASSESSMENT: 0-10

## 2024-08-20 NOTE — DISCHARGE INSTRUCTIONS
color for a few days after the test, but call if it does not get better or if your urine is cloudy, smells bad, or has pus in it.    After general anesthesia or intravenous sedation, for 24 hours or while taking prescription Narcotics:  Limit your activities  A responsible adult needs to be with you for the next 24 hours  Do not drive and operate hazardous machinery  Do not make important personal or business decisions  Do not drink alcoholic beverages  If you have not urinated within 8 hours after discharge, and you are experiencing discomfort from urinary retention, please go to the nearest ED.  If you have sleep apnea and have a CPAP machine, please use it for all naps and sleeping.  Please use caution when taking narcotics and any of your home medications that may cause drowsiness.  *  Please give a list of your current medications to your Primary Care Provider.  *  Please update this list whenever your medications are discontinued, doses are      changed, or new medications (including over-the-counter products) are added.  *  Please carry medication information at all times in case of emergency situations.    These are general instructions for a healthy lifestyle:  No smoking/ No tobacco products/ Avoid exposure to second hand smoke  Surgeon General's Warning:  Quitting smoking now greatly reduces serious risk to your health.  Obesity, smoking, and sedentary lifestyle greatly increases your risk for illness  A healthy diet, regular physical exercise & weight monitoring are important for maintaining a healthy lifestyle    You may be retaining fluid if you have a history of heart failure or if you experience any of the following symptoms:  Weight gain of 3 pounds or more overnight or 5 pounds in a week, increased swelling in our hands or feet or shortness of breath while lying flat in bed.  Please call your doctor as soon as you notice any of these symptoms; do not wait until your next office visit.

## 2024-08-20 NOTE — PERIOP NOTE
Pt and  Sudarshan given discharge instructions at bedside, both verbalize understanding. All questions answered.

## 2024-08-20 NOTE — ANESTHESIA POSTPROCEDURE EVALUATION
Department of Anesthesiology  Postprocedure Note    Patient: Waleska Gr  MRN: [id number]  YOB: 1970  Date of evaluation: 8/20/2024    Procedure Summary       Date: 08/20/24 Room / Location: [facility] MAIN OR [address] [place] [address] MAIN OR    Anesthesia Start: 0652 Anesthesia Stop: 0803    Procedures:       CYSTOSCOPY, LEFT URETEROSCOPY, HOLMIUM LASER LITHOTRIPSY AND LEFT URETERAL STENT PALCEMENT (Left: Ureter)      CYSTOSCOPY URETERAL STENT INSERTION (Left: Ureter) Diagnosis:       Left ureteral calculus      (Left ureteral calculus [N20.1])    Providers: Corey Oliveros MD Responsible Provider: Charlie Sanchez MD    Anesthesia Type: General ASA Status: 3            Anesthesia Type: General    Olivia Phase I: Olivia Score: 8    Olivia Phase II: Olivia Score: 10    Anesthesia Post Evaluation    Patient location during evaluation: PACU  Patient participation: complete - patient participated  Level of consciousness: awake and alert  Airway patency: patent  Nausea & Vomiting: no nausea and no vomiting  Cardiovascular status: hemodynamically stable  Respiratory status: acceptable, nonlabored ventilation and spontaneous ventilation  Hydration status: euvolemic  Comments: /60   Pulse 62   Temp 97.6 °F (36.4 °C) (Temporal)   Resp 14   Ht 1.473 m (4' 10\")   Wt 87.1 kg (192 lb)   SpO2 93%   BMI 40.13 kg/m²     Multimodal analgesia pain management approach  Pain management: adequate and satisfactory to patient    No notable events documented.

## 2024-08-20 NOTE — OP NOTE
[address]  [address]                            OPERATIVE REPORT      PATIENT NAME: MOI GRAY         : 1970  MED REC NO: [id number]                       ROOM: [facility]  ACCOUNT NO: [id number]                       ADMIT DATE: 2024  PROVIDER: Corey Oliveros MD    DATE OF SERVICE:  2024    PREOPERATIVE DIAGNOSES:  Left 5 mm distal ureteral stone.    POSTOPERATIVE DIAGNOSES:  Left 5 mm distal ureteral stone.    PROCEDURES PERFORMED:  Cystoscopy with left retrograde pyelogram, left ureteroscopy, left laser ablation of stone, basket retrieval of fragments, and placement of a left ureteral stent with string.    SURGEON:  Corey Oliveros MD    ASSISTANT:  Amanda Duran.    ANESTHESIA:  General with ET.    ESTIMATED BLOOD LOSS:  Minimal.    SPECIMENS REMOVED:  Stone fragment for analysis.    INTRAOPERATIVE FINDINGS:  A 5 mm distal ureteral stone with some mild-to-moderate bullous edema of ureteral wall where stone was impacted, ablated completely and all fragments removed.     COMPLICATIONS:  None.    IMPLANTS:  A 6-German by 24 cm ureteral stent with string attached to left thigh.    INDICATIONS:  The patient is a pleasant 53-year-old female, who has been unable to pass a left distal ureteral stone.  After a trial of passage, she now presents for ureteroscopy.  The risks and benefits of the procedure were discussed with the patient preoperatively and she consented to move forward.    DESCRIPTION OF PROCEDURE:  After informed consent was obtained, she was taken to the operating room 11 at [facility].  After induction of general anesthesia, the patient was carefully positioned in low lithotomy position.  Her genitalia were prepped and draped in usual sterile fashion.  Time-out was performed.  She was given Ancef as a prophylactic antibiotic.  We verified the side of the procedure as her left side.

## 2024-08-20 NOTE — BRIEF OP NOTE
Brief Postoperative Note      Patient: Waleska Gr  YOB: 1970  MRN: [id number]    Date of Procedure: 8/20/2024    Pre-Op Diagnosis Codes:      * Left ureteral calculus [N20.1]    Post-Op Diagnosis: Same       Procedure(s):  CYSTOSCOPY, LEFT URETEROSCOPY, HOLMIUM LASER LITHOTRIPSY AND LEFT URETERAL STENT PALCEMENT  CYSTOSCOPY URETERAL STENT INSERTION    Surgeon(s):  Corey Oliveros MD    Assistant:  * No surgical staff found *    Anesthesia: General    Estimated Blood Loss (mL): Minimal    Complications: None    Specimens:   ID Type Source Tests Collected by Time Destination   1 : Left ureteral stone Stone (Calculus) Ureter STONE ANALYSIS Corey Oliveros MD 8/20/2024 0748        Implants:  Implant Name Type Inv. Item Serial No.  Lot No. LRB No. Used Action   STENT URET 6FR L24CM HYDR+ GRAD CIRCUMFERENTIAL MRK LO PROF - [id number]  STENT URET 6FR L24CM HYDR+ GRAD CIRCUMFERENTIAL MRK LO PROF  [facility] UROLOGY-WD [id number] Left 1 Implanted         Drains: * No LDAs found *    Findings:  Infection Present At Time Of Surgery (PATOS) (choose all levels that have infection present):  No infection present  Other Findings: 5 mm left distal ureteral stone; fragmented with holmium laser and removed; JJ stent with string.     Electronically signed by Corey Oliveros MD on 8/20/2024 at 7:54 AM

## 2024-08-20 NOTE — ANESTHESIA PRE PROCEDURE
Department of Anesthesiology  Preprocedure Note       Name:  Waleska Gr   Age:  53 y.o.  :  1970                                          MRN:  [id number]         Date:  2024      Surgeon: Surgeon(s):  Corey Oliveros MD    Procedure: Procedure(s):  CYSTOSCOPY, LEFT URETEROSCOPY, HOLMIUM LASER LITHOTRIPSY AND LEFT URETERAL STENT PALCEMENT  CYSTOSCOPY URETERAL STENT INSERTION    Medications prior to admission:   Prior to Admission medications    Medication Sig Start Date End Date Taking? Authorizing Provider   traZODone (DESYREL) 50 MG tablet 1 tablet nightly   Yes See Barahona MD   Lifitegrast (XIIDRA) 5 % SOLN 1 drop into affected eye Ophthalmic Twice a day   Yes See Barahona MD   HYDROcodone-acetaminophen (NORCO) 5-325 MG per tablet Take 1 tablet by mouth every 6 hours as needed for Pain. Max Daily Amount: 4 tablets    See Barahona MD   ondansetron (ZOFRAN-ODT) 4 MG disintegrating tablet Take 1 tablet by mouth 3 times daily as needed for Nausea or Vomiting 24   Mylene Hector APRN - CNP   hyoscyamine (LEVSIN/SL) 125 MCG sublingual tablet Place 1 tablet under the tongue every 6 hours as needed (spasm) 24   Mylene Hector APRN - CNP   ondansetron (ZOFRAN) 4 MG tablet Take 1 tablet by mouth 3 times daily as needed for Nausea or Vomiting 7/10/24   Jaqueline Cotton PA       Current medications:    No current facility-administered medications for this encounter.     Current Outpatient Medications   Medication Sig Dispense Refill   • traZODone (DESYREL) 50 MG tablet 1 tablet nightly     • Lifitegrast (XIIDRA) 5 % SOLN 1 drop into affected eye Ophthalmic Twice a day     • HYDROcodone-acetaminophen (NORCO) 5-325 MG per tablet Take 1 tablet by mouth every 6 hours as needed for Pain. Max Daily Amount: 4 tablets     • ondansetron (ZOFRAN-ODT) 4 MG disintegrating tablet Take 1 tablet by mouth 3 times daily as needed for Nausea or Vomiting 21 tablet 0   •

## 2024-08-21 ENCOUNTER — TELEPHONE (OUTPATIENT)
Dept: ONCOLOGY | Age: 54
End: 2024-08-21

## 2024-08-21 NOTE — TELEPHONE ENCOUNTER
Returned call to patient.  Patient had procedure yesterday and wants Dr. Oliveros to know she's still bleeding.  States urine is pinkish, but bright red at the end of urination.  Denies pain, fever or bloody leakage between episodes of urination.  Informed will forward to Dr. Oliveros's team.

## 2024-08-21 NOTE — TELEPHONE ENCOUNTER
Chart has been reviewed.  Returned call to patient.  Patient informed   Some bleeding is expected for a few days. She should increase her oral hydration to flush the bladder. Confirmed patient not taking any OTC aspirin products or blood thinners.  Informed  as long as she is emptying her bladder when she voids then it is ok to watch this for a few days as I expect this to continue to improve. Please have her reach back out Friday if no improvement.   Patient verbalized understanding.

## 2024-08-21 NOTE — TELEPHONE ENCOUNTER
Some bleeding is expected for a few days. She should increase her oral hydration to flush the bladder. Please confirm that she is not taking any blood thinners. I do not see any on her med list. As long as she is emptying her bladder when she voids then it is ok to watch this for a few days as I expect this to continue to improve. Please have her reach back out Friday if no improvement.    Thanks

## 2024-08-21 NOTE — TELEPHONE ENCOUNTER
Physician provider: Dr. Oliveros  Reason for today's call (Please detail here patients chief complaint): bleeding in urine  Last office visit:n/a  Preferred pharmacy (If refill request): n/a  Calls to office within the last 48 hours?:No    Patient notified that their information will be routed to the [facility] clinical triage team for review. Patient is advised that they will receive a phone call from the triage department. If symptom related and symptoms worsen before receiving a call back, the patient has been advised to proceed to the nearest ED.          Pt still has bleeding in urine.  She had a procedure yesterday and pt was told the bleeding would stop fairly quickly but it still ongoing.

## 2024-08-22 ENCOUNTER — CLINICAL DOCUMENTATION (OUTPATIENT)
Dept: ONCOLOGY | Age: 54
End: 2024-08-22

## 2024-08-22 NOTE — PROGRESS NOTES
Post op call made to the patient regarding recent procedure. Patient had called yesterday reporting some bleeding. MARIBEL Holloway had encouraged her some bleeding was to be expected. We also encouraged her to increase her oral hydration. Patient verbalized understanding and stated she will call back if she encounters anymore problems. I also informed her that Dr Oliveros wanted her to have a ZELDA prior to her appointment in September. Patient has verbalized understanding.

## 2024-08-23 ENCOUNTER — HOSPITAL ENCOUNTER (OUTPATIENT)
Dept: LAB | Age: 54
Discharge: HOME | End: 2024-08-23
Payer: COMMERCIAL

## 2024-08-23 ENCOUNTER — OFFICE VISIT (OUTPATIENT)
Dept: ONCOLOGY | Age: 54
End: 2024-08-23

## 2024-08-23 VITALS
WEIGHT: 191 LBS | BODY MASS INDEX: 40.09 KG/M2 | TEMPERATURE: 98.1 F | SYSTOLIC BLOOD PRESSURE: 101 MMHG | DIASTOLIC BLOOD PRESSURE: 71 MMHG | HEART RATE: 75 BPM | RESPIRATION RATE: 19 BRPM | OXYGEN SATURATION: 97 % | HEIGHT: 58 IN

## 2024-08-23 DIAGNOSIS — N20.1 LEFT URETERAL CALCULUS: ICD-10-CM

## 2024-08-23 DIAGNOSIS — N20.1 LEFT URETERAL STONE: ICD-10-CM

## 2024-08-23 DIAGNOSIS — N20.1 LEFT URETERAL STONE: Primary | ICD-10-CM

## 2024-08-23 LAB
ANION GAP SERPL CALC-SCNC: 9 MMOL/L (ref 9–18)
APPEARANCE UR: ABNORMAL
BACTERIA URNS QL MICRO: ABNORMAL /HPF
BILIRUB UR QL: NEGATIVE
BUN SERPL-MCNC: 19 MG/DL (ref 6–23)
CALCIUM SERPL-MCNC: 9.6 MG/DL (ref 8.8–10.2)
CHLORIDE SERPL-SCNC: 104 MMOL/L (ref 98–107)
CO2 SERPL-SCNC: 28 MMOL/L (ref 20–28)
COLOR UR: ABNORMAL
CREAT SERPL-MCNC: 0.76 MG/DL (ref 0.6–1.1)
EPI CELLS #/AREA URNS HPF: ABNORMAL /HPF
GLUCOSE SERPL-MCNC: 89 MG/DL (ref 70–99)
GLUCOSE UR STRIP.AUTO-MCNC: NEGATIVE MG/DL
HGB UR QL STRIP: ABNORMAL
KETONES UR QL STRIP.AUTO: NEGATIVE MG/DL
LEUKOCYTE ESTERASE UR QL STRIP.AUTO: ABNORMAL
MUCOUS THREADS URNS QL MICRO: ABNORMAL /LPF
NITRITE UR QL STRIP.AUTO: NEGATIVE
PH UR STRIP: 7 (ref 5–9)
POTASSIUM SERPL-SCNC: 4 MMOL/L (ref 3.5–5.1)
PROT UR STRIP-MCNC: 100 MG/DL
RBC #/AREA URNS HPF: >100 /HPF
SODIUM SERPL-SCNC: 141 MMOL/L (ref 136–145)
SP GR UR REFRACTOMETRY: 1.01 (ref 1–1.02)
UROBILINOGEN UR QL STRIP.AUTO: 0.2 EU/DL
WBC URNS QL MICRO: ABNORMAL /HPF

## 2024-08-23 PROCEDURE — 87086 URINE CULTURE/COLONY COUNT: CPT

## 2024-08-23 PROCEDURE — 80048 BASIC METABOLIC PNL TOTAL CA: CPT

## 2024-08-23 PROCEDURE — 81001 URINALYSIS AUTO W/SCOPE: CPT

## 2024-08-23 PROCEDURE — 36415 COLL VENOUS BLD VENIPUNCTURE: CPT

## 2024-08-23 RX ORDER — ERYTHROMYCIN 5 MG/G
OINTMENT OPHTHALMIC
COMMUNITY

## 2024-08-23 RX ORDER — SULFAMETHOXAZOLE AND TRIMETHOPRIM 800; 160 MG/1; MG/1
1 TABLET ORAL 2 TIMES DAILY
Qty: 6 TABLET | Refills: 0 | Status: SHIPPED | OUTPATIENT
Start: 2024-08-23 | End: 2024-08-26

## 2024-08-23 ASSESSMENT — PATIENT HEALTH QUESTIONNAIRE - PHQ9
SUM OF ALL RESPONSES TO PHQ9 QUESTIONS 1 & 2: 0
SUM OF ALL RESPONSES TO PHQ QUESTIONS 1-9: 0
1. LITTLE INTEREST OR PLEASURE IN DOING THINGS: NOT AT ALL
SUM OF ALL RESPONSES TO PHQ QUESTIONS 1-9: 0
2. FEELING DOWN, DEPRESSED OR HOPELESS: NOT AT ALL
SUM OF ALL RESPONSES TO PHQ QUESTIONS 1-9: 0
SUM OF ALL RESPONSES TO PHQ QUESTIONS 1-9: 0

## 2024-08-23 NOTE — PROGRESS NOTES
but may still contain some grammatical/other typographical errors.      Amie Smith PA-C  Urologic Oncology  [facility]      I have seen and examined this patient.  I have reviewed the note and agree with the outlined plan.  I recommend we check a urine culture today.  I like to treat her with 3 days of Bactrim.  She will hopefully remove her stent this coming Monday morning.  She will then follow-up with me in 1 month with renal ultrasound and BMP.      Dandre Oliveros MD  Urologic Oncology  [facility]

## 2024-08-25 LAB
BACTERIA SPEC CULT: NORMAL
BACTERIA SPEC CULT: NORMAL
SERVICE CMNT-IMP: NORMAL

## 2024-09-11 DIAGNOSIS — N20.1 LEFT URETERAL STONE: Primary | ICD-10-CM

## 2024-09-16 ENCOUNTER — OFFICE VISIT (OUTPATIENT)
Dept: ONCOLOGY | Age: 54
End: 2024-09-16
Payer: COMMERCIAL

## 2024-09-16 ENCOUNTER — HOSPITAL ENCOUNTER (OUTPATIENT)
Dept: LAB | Age: 54
Discharge: HOME | End: 2024-09-19
Payer: COMMERCIAL

## 2024-09-16 VITALS
TEMPERATURE: 98.7 F | WEIGHT: 194 LBS | BODY MASS INDEX: 40.72 KG/M2 | HEART RATE: 61 BPM | OXYGEN SATURATION: 100 % | DIASTOLIC BLOOD PRESSURE: 62 MMHG | HEIGHT: 58 IN | RESPIRATION RATE: 12 BRPM | SYSTOLIC BLOOD PRESSURE: 121 MMHG

## 2024-09-16 DIAGNOSIS — N20.1 LEFT URETERAL STONE: Primary | ICD-10-CM

## 2024-09-16 DIAGNOSIS — N20.1 LEFT URETERAL STONE: ICD-10-CM

## 2024-09-16 LAB
ANION GAP SERPL CALC-SCNC: 11 MMOL/L (ref 9–18)
BUN SERPL-MCNC: 20 MG/DL (ref 6–23)
CALCIUM SERPL-MCNC: 9.4 MG/DL (ref 8.8–10.2)
CHLORIDE SERPL-SCNC: 105 MMOL/L (ref 98–107)
CO2 SERPL-SCNC: 25 MMOL/L (ref 20–28)
CREAT SERPL-MCNC: 0.71 MG/DL (ref 0.6–1.1)
GLUCOSE SERPL-MCNC: 91 MG/DL (ref 70–99)
POTASSIUM SERPL-SCNC: 4.1 MMOL/L (ref 3.5–5.1)
SODIUM SERPL-SCNC: 141 MMOL/L (ref 136–145)

## 2024-09-16 PROCEDURE — 36415 COLL VENOUS BLD VENIPUNCTURE: CPT

## 2024-09-16 PROCEDURE — 80048 BASIC METABOLIC PNL TOTAL CA: CPT

## 2024-09-16 PROCEDURE — 99213 OFFICE O/P EST LOW 20 MIN: CPT | Performed by: UROLOGY

## 2024-09-16 ASSESSMENT — ENCOUNTER SYMPTOMS
GASTROINTESTINAL NEGATIVE: 1
RESPIRATORY NEGATIVE: 1

## (undated) DEVICE — GUIDEWIRE UROLOGICAL STR 3 CM 0.038 INX150 CM DUAL-FLEX

## (undated) DEVICE — GLOVE SURG SZ 75 L12IN FNGR THK79MIL GRN LTX FREE

## (undated) DEVICE — SOLUTION IRRIG 3000ML H2O STRL BAG

## (undated) DEVICE — NITINOL STONE RETRIEVAL DEVICE: Brand: DAKOTA

## (undated) DEVICE — GARMENT,MEDLINE,DVT,INT,CALF,MED, GEN2: Brand: MEDLINE

## (undated) DEVICE — FLEXIVA  PULSE  AND  FLEXIVA  PULSE  TRACTIP  LASER  FIBERS  ARE  HIGH  POWER  SINGLE-USE FIBER: Brand: FLEXIVA PULSE

## (undated) DEVICE — PACK SURGICAL PROCEDURE KIT CYSTOSCOPY TOTE

## (undated) DEVICE — Device